# Patient Record
Sex: FEMALE | URBAN - METROPOLITAN AREA
[De-identification: names, ages, dates, MRNs, and addresses within clinical notes are randomized per-mention and may not be internally consistent; named-entity substitution may affect disease eponyms.]

---

## 2021-08-31 ENCOUNTER — OFFICE VISIT (OUTPATIENT)
Dept: URBAN - METROPOLITAN AREA CLINIC 13 | Facility: CLINIC | Age: 56
End: 2021-08-31

## 2021-08-31 DIAGNOSIS — H30.93 UNSPECIFIED CHORIORETINAL INFLAMMATION, BILATERAL: Primary | ICD-10-CM

## 2021-08-31 DIAGNOSIS — Z96.1 PRESENCE OF INTRAOCULAR LENS: ICD-10-CM

## 2021-08-31 PROCEDURE — 67028 INJECTION EYE DRUG: CPT | Performed by: OPHTHALMOLOGY

## 2021-08-31 PROCEDURE — 99213 OFFICE O/P EST LOW 20 MIN: CPT | Performed by: OPHTHALMOLOGY

## 2021-08-31 PROCEDURE — 92134 CPTRZ OPH DX IMG PST SGM RTA: CPT | Performed by: OPHTHALMOLOGY

## 2021-08-31 ASSESSMENT — INTRAOCULAR PRESSURE
OS: 12
OD: 11

## 2021-08-31 NOTE — IMPRESSION/PLAN
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 S/P Ozurdex OU, last OD 7/19/19; last OS 11/15/19 
s/p Triesence OU, last OS 11/9/17, last OD 2/19/18 S/P [place]arside Study
s/p Yutiq OD 2/25/2020 Plan: Exam confirms Chronic Posterior Uveitis/Chorioretinitis OU. OCT confirms CME OU; improving OU s/p Yutiq OS 2/25/2020 and Yutiq OD 1/28/20. Patient is currently uninsured. Declined test of HLAA29 due to cost.  Discussed the need for ongoing therapy. At risk for vision loss. Limited treatment options available as uninsured and cannot afford more expensive treatments. Saw rheum, was given Rx for cellcept 2-3grams a day - she doesn't want to take it. Understands can go to local therapy only but would require regular injections. Recommend observation OD today, treatment with Ozurdex OS. Pt elects to proceed with Ozurdex (sample) OS, which was performed in office without complication. Add artifical tears QID/PRN for discomfort. Stressed need to try to obtain insurance.  

RTC 6 weeks OCT OU reeval Ozurdex

## 2021-10-12 ENCOUNTER — OFFICE VISIT (OUTPATIENT)
Dept: URBAN - METROPOLITAN AREA CLINIC 13 | Facility: CLINIC | Age: 56
End: 2021-10-12

## 2021-10-12 PROCEDURE — 99213 OFFICE O/P EST LOW 20 MIN: CPT | Performed by: OPHTHALMOLOGY

## 2021-10-12 PROCEDURE — 92134 CPTRZ OPH DX IMG PST SGM RTA: CPT | Performed by: OPHTHALMOLOGY

## 2021-10-12 ASSESSMENT — INTRAOCULAR PRESSURE
OD: 14
OS: 15

## 2021-10-12 NOTE — IMPRESSION/PLAN
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 S/P Ozurdex OU, last OD 7/19/19; last OS 11/15/19 
s/p Triesence OU, last OS 11/9/17, last OD 2/19/18 S/P Clearside Study
s/p Yutiq OD 2/25/2020 Plan: Exam confirms Chronic Posterior Uveitis/Chorioretinitis OU. OCT confirms CME OU; improving OS s/p Ozurdex 8/31/21. S/p Yutiq OS 2/25/2020 and Yutiq OD 1/28/20. Patient is currently uninsured. Declined test of HLAA29 due to cost.  Discussed the need for ongoing therapy. At risk for vision loss. Limited treatment options available as uninsured and cannot afford more expensive treatments. Saw rheum, was given Rx for cellcept 2-3grams a day - she doesn't want to take it. Understands can go to local therapy only but would require regular injections. Recommend observation OU today. Will consider Yutiq for any future exacerbation. Add artifical tears QID/PRN for discomfort. Stressed need to try to obtain insurance.  

RTC 8 weeks OCT OU reeval Ozurdex

## 2021-12-10 ENCOUNTER — OFFICE VISIT (OUTPATIENT)
Dept: URBAN - METROPOLITAN AREA CLINIC 13 | Facility: CLINIC | Age: 56
End: 2021-12-10

## 2021-12-10 PROCEDURE — 67028 INJECTION EYE DRUG: CPT | Performed by: OPHTHALMOLOGY

## 2021-12-10 PROCEDURE — 92134 CPTRZ OPH DX IMG PST SGM RTA: CPT | Performed by: OPHTHALMOLOGY

## 2021-12-10 ASSESSMENT — INTRAOCULAR PRESSURE
OD: 13
OS: 14

## 2021-12-10 NOTE — IMPRESSION/PLAN
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 S/P Ozurdex OU, last OD 7/19/19; last OS 08/31/21
s/p Triesence OU, last OS 11/9/17, last OD 2/19/18 S/P [place]arside Study
s/p Yutiq OD 2/25/2020 Plan: Exam confirms Chronic Posterior Uveitis/Chorioretinitis OU. OCT confirms CME OU; recurrent OS s/p Ozurdex 8/31/21. S/p Yutiq OS 2/25/2020 and Yutiq OD 1/28/20. Patient is currently uninsured. Declined test of HLAA29 due to cost.  Discussed the need for ongoing therapy. At risk for vision loss. Limited treatment options available as uninsured and cannot afford more expensive treatments. Saw rheum, was given Rx for cellcept 2-3grams a day - she doesn't want to take it. Understands can go to local therapy only but would require regular injections. Recommend Ozurdex OS today - pt elects to proceed. Add artifical tears QID/PRN for discomfort. Stressed need to try to obtain insurance.  

RTC 8 weeks OCT OU reeval Ozurdex

## 2022-02-04 ENCOUNTER — OFFICE VISIT (OUTPATIENT)
Dept: URBAN - METROPOLITAN AREA CLINIC 13 | Facility: CLINIC | Age: 57
End: 2022-02-04

## 2022-02-04 PROCEDURE — 92134 CPTRZ OPH DX IMG PST SGM RTA: CPT | Performed by: OPHTHALMOLOGY

## 2022-02-04 PROCEDURE — 99213 OFFICE O/P EST LOW 20 MIN: CPT | Performed by: OPHTHALMOLOGY

## 2022-02-04 ASSESSMENT — INTRAOCULAR PRESSURE
OD: 10
OS: 15

## 2022-02-04 NOTE — IMPRESSION/PLAN
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 S/P Ozurdex OU, last OD 7/19/19; last OS 12/10/21
s/p Triesence OU, last OS 11/9/17, last OD 2/19/18 S/P Clearside Study
s/p Yutiq OD 2/25/2020 Plan: Chronic Posterior Uveitis/Chorioretinitis OU. Exam/OCT confirms CME OU; improving OS s/p Ozurdex 12/10/21. S/p Yutiq OS 2/25/2020 and Yutiq OD 1/28/20. Patient is currently uninsured. Declined test of HLAA29 due to cost.  Discussed the need for ongoing therapy. At risk for vision loss. Limited treatment options available as uninsured and cannot afford more expensive treatments. Saw rheum, was given Rx for cellcept 2-3grams a day - pt declines. Understands can go to local therapy only but would require regular injections. Recommend observation OS today. Add artifical tears QID/PRN for discomfort. Stressed need to try to obtain insurance.  

RTC 8 weeks OCT OU reeval Ozurdex vs Faustino Ko

## 2022-04-01 ENCOUNTER — OFFICE VISIT (OUTPATIENT)
Dept: URBAN - METROPOLITAN AREA CLINIC 13 | Facility: CLINIC | Age: 57
End: 2022-04-01

## 2022-04-01 PROCEDURE — 67028 INJECTION EYE DRUG: CPT | Performed by: OPHTHALMOLOGY

## 2022-04-01 PROCEDURE — 92134 CPTRZ OPH DX IMG PST SGM RTA: CPT | Performed by: OPHTHALMOLOGY

## 2022-04-01 ASSESSMENT — INTRAOCULAR PRESSURE
OS: 12
OD: 12

## 2022-04-01 NOTE — IMPRESSION/PLAN
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 S/P Ozurdex OU, last OD 7/19/19; last OS 12/10/21
s/p Triesence OU, last OS 11/9/17, last OD 2/19/18 S/P [place]arside Study
s/p Yutiq OD 2/25/2020 Plan: Chronic Posterior Uveitis/Chorioretinitis OU. Exam/OCT confirms CME OU; worsening OS s/p Ozurdex 12/10/21. Stable, mild CME OD. S/p Yutiq OS 2/25/2020 and Yutiq OD 1/28/20. Patient is currently uninsured. Declined test of HLAA29 due to cost.  Discussed the need for ongoing therapy. At risk for vision loss. Limited treatment options available as uninsured and cannot afford more expensive treatments. Saw rheum, was given Rx for cellcept 2-3grams a day - pt has declined. Understands can go to local therapy only but would require regular injections. Recommend Ozurdex OS today. Stressed need to try to obtain insurance.  

RTC 8 weeks OCT OU reeval Ozurdex vs [facility] smiley

## 2022-05-24 ENCOUNTER — OFFICE VISIT (OUTPATIENT)
Dept: URBAN - METROPOLITAN AREA CLINIC 13 | Facility: CLINIC | Age: 57
End: 2022-05-24

## 2022-05-24 DIAGNOSIS — H30.93 UNSPECIFIED CHORIORETINAL INFLAMMATION, BILATERAL: Primary | ICD-10-CM

## 2022-05-24 DIAGNOSIS — Z96.1 PRESENCE OF INTRAOCULAR LENS: ICD-10-CM

## 2022-05-24 PROCEDURE — 92134 CPTRZ OPH DX IMG PST SGM RTA: CPT | Performed by: OPHTHALMOLOGY

## 2022-05-24 PROCEDURE — 99213 OFFICE O/P EST LOW 20 MIN: CPT | Performed by: OPHTHALMOLOGY

## 2022-05-24 ASSESSMENT — INTRAOCULAR PRESSURE
OS: 16
OD: 14

## 2022-05-24 NOTE — IMPRESSION/PLAN
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 S/P Ozurdex OU, last OD 7/19/19; last OS 4/1/22
s/p Triesence OU, last OS 11/9/17, last OD 2/19/18 S/P Clearside Study
s/p Yutiq OD 2/25/2020 Plan: Chronic Posterior Uveitis/Chorioretinitis OU. Exam/OCT confirms CME OU; improving OS s/p Ozurdex 4/1/22. Stable, mild CME OD. S/p Yutiq OS 2/25/2020 and Yutiq OD 1/28/20. Patient remains uninsured. Declined test of HLAA29 due to cost.  Discussed the need for ongoing therapy. At risk for vision loss. Limited treatment options available as uninsured and cannot afford more expensive treatments. Saw rheum, was given Rx for cellcept 2-3grams a day - pt has declined. Understands can go to local therapy only but would require regular injections. Recommend observation OU today. Stressed need to try to obtain insurance.  

RTC 8 weeks OCT OU reeval Ozurdex vs Michael Todd

## 2022-11-08 ENCOUNTER — OFFICE VISIT (OUTPATIENT)
Dept: URBAN - METROPOLITAN AREA CLINIC 13 | Facility: CLINIC | Age: 57
End: 2022-11-08

## 2022-11-08 DIAGNOSIS — Z96.1 PRESENCE OF INTRAOCULAR LENS: ICD-10-CM

## 2022-11-08 DIAGNOSIS — H30.93 UNSPECIFIED CHORIORETINAL INFLAMMATION, BILATERAL: Primary | ICD-10-CM

## 2022-11-08 PROCEDURE — 99213 OFFICE O/P EST LOW 20 MIN: CPT | Performed by: OPHTHALMOLOGY

## 2022-11-08 PROCEDURE — 92134 CPTRZ OPH DX IMG PST SGM RTA: CPT | Performed by: OPHTHALMOLOGY

## 2022-11-08 ASSESSMENT — INTRAOCULAR PRESSURE
OD: 14
OS: 16

## 2022-11-08 NOTE — IMPRESSION/PLAN
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 S/P Ozurdex OU, last OD 7/19/19; last OS 4/1/22
s/p Triesence OU, last OS 11/9/17, last OD 2/19/18 S/P [place]arside Study
s/p Yutiq OD 08/02/22 Plan: Chronic Posterior Uveitis/Chorioretinitis OU. Exam/OCT confirms CME OU; stable OS s/p Yutiq 08/02/22. Stable, mild CME OD. S/p Yutiq OD 1/28/20. Patient remains uninsured. Declined test of HLAA29 due to cost.  Discussed the need for ongoing therapy. At risk for vision loss. Limited treatment options available as uninsured and cannot afford more expensive treatments. Saw rheum, was given Rx for cellcept 2-3grams a day - pt has declined. Understands can go to local therapy only but would require regular injections. Recommend observation OU today.  

RTC 3 months OCT OU reeval Ozurdex vs Thomas Zendejas

## 2023-03-31 ENCOUNTER — OFFICE VISIT (OUTPATIENT)
Dept: URBAN - METROPOLITAN AREA CLINIC 13 | Facility: CLINIC | Age: 58
End: 2023-03-31

## 2023-03-31 DIAGNOSIS — H30.93 UNSPECIFIED CHORIORETINAL INFLAMMATION, BILATERAL: Primary | ICD-10-CM

## 2023-03-31 DIAGNOSIS — Z96.1 PRESENCE OF INTRAOCULAR LENS: ICD-10-CM

## 2023-03-31 PROCEDURE — 99213 OFFICE O/P EST LOW 20 MIN: CPT | Performed by: OPHTHALMOLOGY

## 2023-03-31 PROCEDURE — 92134 CPTRZ OPH DX IMG PST SGM RTA: CPT | Performed by: OPHTHALMOLOGY

## 2023-03-31 ASSESSMENT — INTRAOCULAR PRESSURE
OS: 15
OD: 14

## 2023-03-31 NOTE — IMPRESSION/PLAN
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 S/P Ozurdex OU, last OD 7/19/19; last OS 4/1/22
s/p Triesence OU, last OS 11/9/17, last OD 2/19/18 S/P Clearside Study
s/p Yutiq OS 08/02/22
s/p Yutiq OD 1/28/20 Plan: Chronic Posterior Uveitis/Chorioretinitis OU. Exam/OCT confirms CME OU - stable appearance OU. Patient remains uninsured. Declined test of HLAA29 due to cost.  Discussed the need for ongoing therapy. At risk for vision loss. Limited treatment options available as uninsured and cannot afford more expensive treatments. Saw rheum, was given Rx for cellcept 2-3grams a day - pt has declined. Understands can go to local therapy only but would require regular injections. Recommend pt continue observation OU today.  

RTC 4 months OCT OU reeval Ozurdex vs Martha Meehan

## 2023-07-21 ENCOUNTER — OFFICE VISIT (OUTPATIENT)
Dept: URBAN - METROPOLITAN AREA CLINIC 13 | Facility: CLINIC | Age: 58
End: 2023-07-21

## 2023-07-21 DIAGNOSIS — H30.93 UNSPECIFIED CHORIORETINAL INFLAMMATION, BILATERAL: Primary | ICD-10-CM

## 2023-07-21 DIAGNOSIS — Z96.1 PRESENCE OF INTRAOCULAR LENS: ICD-10-CM

## 2023-07-21 PROCEDURE — 99213 OFFICE O/P EST LOW 20 MIN: CPT | Performed by: OPHTHALMOLOGY

## 2023-07-21 PROCEDURE — 92134 CPTRZ OPH DX IMG PST SGM RTA: CPT | Performed by: OPHTHALMOLOGY

## 2023-07-21 ASSESSMENT — INTRAOCULAR PRESSURE
OS: 10
OD: 10

## 2023-07-21 NOTE — IMPRESSION/PLAN
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 S/P Ozurdex OU, last OD 7/19/19; last OS 4/1/22
s/p Triesence OU, last OS 11/9/17, last OD 2/19/18 S/P [place]arside Study
s/p Yutiq OS 08/02/22
s/p Yutiq OD 1/28/20 Plan: Chronic Posterior Uveitis/Chorioretinitis OU. 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 Patient remains uninsured. Declined test of HLAA29 due to cost.  Discussed the need for ongoing therapy. At risk for vision loss. Limited treatment options available as uninsured and cannot afford more expensive treatments. Saw rheum, was given Rx for cellcept 2-3grams a day - pt has declined. Understands can go to local therapy only but would require regular injections. Pt elects to continue observation OU today.  

RTC 4 months OCT OU reeval Ozurdex vs Faustino Ko

## 2024-02-09 ENCOUNTER — OFFICE VISIT (OUTPATIENT)
Dept: URBAN - METROPOLITAN AREA CLINIC 7 | Facility: CLINIC | Age: 59
End: 2024-02-09

## 2024-02-09 DIAGNOSIS — Z96.1 PRESENCE OF INTRAOCULAR LENS: ICD-10-CM

## 2024-02-09 DIAGNOSIS — H30.93 UNSPECIFIED CHORIORETINAL INFLAMMATION, BILATERAL: Primary | ICD-10-CM

## 2024-02-09 PROCEDURE — 92134 CPTRZ OPH DX IMG PST SGM RTA: CPT | Performed by: OPHTHALMOLOGY

## 2024-02-09 PROCEDURE — 99213 OFFICE O/P EST LOW 20 MIN: CPT | Performed by: OPHTHALMOLOGY

## 2024-02-09 ASSESSMENT — INTRAOCULAR PRESSURE
OD: 17
OS: 19

## 2024-12-20 ENCOUNTER — OFFICE VISIT (OUTPATIENT)
Dept: URBAN - METROPOLITAN AREA CLINIC 13 | Facility: CLINIC | Age: 59
End: 2024-12-20

## 2024-12-20 DIAGNOSIS — H30.93 UNSPECIFIED CHORIORETINAL INFLAMMATION, BILATERAL: Primary | ICD-10-CM

## 2024-12-20 DIAGNOSIS — Z96.1 PRESENCE OF INTRAOCULAR LENS: ICD-10-CM

## 2024-12-20 PROCEDURE — 92134 CPTRZ OPH DX IMG PST SGM RTA: CPT | Performed by: OPHTHALMOLOGY

## 2024-12-20 PROCEDURE — 99213 OFFICE O/P EST LOW 20 MIN: CPT | Performed by: OPHTHALMOLOGY

## 2024-12-20 ASSESSMENT — INTRAOCULAR PRESSURE
OS: 12
OD: 14